# Patient Record
Sex: FEMALE | Race: BLACK OR AFRICAN AMERICAN | NOT HISPANIC OR LATINO | Employment: FULL TIME | URBAN - METROPOLITAN AREA
[De-identification: names, ages, dates, MRNs, and addresses within clinical notes are randomized per-mention and may not be internally consistent; named-entity substitution may affect disease eponyms.]

---

## 2017-07-20 ENCOUNTER — OFFICE VISIT (OUTPATIENT)
Dept: OBSTETRICS AND GYNECOLOGY | Facility: CLINIC | Age: 27
End: 2017-07-20
Payer: MEDICAID

## 2017-07-20 ENCOUNTER — LAB VISIT (OUTPATIENT)
Dept: LAB | Facility: HOSPITAL | Age: 27
End: 2017-07-20
Attending: NURSE PRACTITIONER
Payer: MEDICAID

## 2017-07-20 VITALS
HEIGHT: 64 IN | BODY MASS INDEX: 28.24 KG/M2 | DIASTOLIC BLOOD PRESSURE: 70 MMHG | SYSTOLIC BLOOD PRESSURE: 119 MMHG | WEIGHT: 165.38 LBS

## 2017-07-20 DIAGNOSIS — B96.89 BV (BACTERIAL VAGINOSIS): ICD-10-CM

## 2017-07-20 DIAGNOSIS — R10.2 PELVIC PAIN IN FEMALE: ICD-10-CM

## 2017-07-20 DIAGNOSIS — Z71.1 CONCERN ABOUT STD IN FEMALE WITHOUT DIAGNOSIS: ICD-10-CM

## 2017-07-20 DIAGNOSIS — Z01.419 CERVICAL SMEAR, AS PART OF ROUTINE GYNECOLOGICAL EXAMINATION: ICD-10-CM

## 2017-07-20 DIAGNOSIS — Z01.419 CERVICAL SMEAR, AS PART OF ROUTINE GYNECOLOGICAL EXAMINATION: Primary | ICD-10-CM

## 2017-07-20 DIAGNOSIS — N76.0 BV (BACTERIAL VAGINOSIS): ICD-10-CM

## 2017-07-20 PROCEDURE — 99385 PREV VISIT NEW AGE 18-39: CPT | Mod: S$PBB,,, | Performed by: NURSE PRACTITIONER

## 2017-07-20 PROCEDURE — 88175 CYTOPATH C/V AUTO FLUID REDO: CPT

## 2017-07-20 PROCEDURE — 80074 ACUTE HEPATITIS PANEL: CPT

## 2017-07-20 PROCEDURE — 99999 PR PBB SHADOW E&M-EST. PATIENT-LVL III: CPT | Mod: PBBFAC,,, | Performed by: NURSE PRACTITIONER

## 2017-07-20 PROCEDURE — 36415 COLL VENOUS BLD VENIPUNCTURE: CPT

## 2017-07-20 PROCEDURE — 86703 HIV-1/HIV-2 1 RESULT ANTBDY: CPT

## 2017-07-20 PROCEDURE — 86592 SYPHILIS TEST NON-TREP QUAL: CPT

## 2017-07-20 RX ORDER — METRONIDAZOLE 500 MG/1
500 TABLET ORAL EVERY 12 HOURS
Qty: 14 TABLET | Refills: 0 | Status: SHIPPED | OUTPATIENT
Start: 2017-07-20 | End: 2017-07-27

## 2017-07-20 NOTE — PATIENT INSTRUCTIONS

## 2017-07-20 NOTE — LETTER
July 20, 2017      Nena Schafer MD  [address] [facility] [address]           [facility]  [address] [facility] [address]  Phone: [phone]  Fax: [phone]          Patient: Jennifer Fuller   MR Number: [id number]   YOB: 1990   Date of Visit: 7/20/2017       Dear Dr. Nena Schafer:    Thank you for referring Jennifer Fuller to me for evaluation. Attached you will find relevant portions of my assessment and plan of care.    If you have questions, please do not hesitate to call me. I look forward to following Jennifer Fuller along with you.    Sincerely,    Leonie Gregorio, BANG    Enclosure  CC:  No Recipients    If you would like to receive this communication electronically, please contact [email or web address] or [phone] to request more information on "[employer]" Link access.    For providers and/or their staff who would like to refer a patient to [facility], please contact us through our one-stop-shop provider referral line, [facility] , at [phone].    If you feel you have received this communication in error or would no longer like to receive these types of communications, please e-mail [email or web address]

## 2017-07-20 NOTE — PROGRESS NOTES
"CC: Well woman exam    Jennifer Fuller is a 26 y.o. female  presents for well woman exam.  LMP: Patient's last menstrual period was 2017 (approximate).. Cycles are every 26-28 days and not heavy.Patient states that she has had lower pelvic pain " on and off " for 2 years , after she had an EAB. Patient wants to see a fertility MD, she has been having unprotected sex for 12 months, not pregnant. Last pap exam was normal, . Wants to proceed with STD work-up.     Past Medical History:   Diagnosis Date    Anemia     Chlamydia      Past Surgical History:   Procedure Laterality Date     SECTION, CLASSIC       Social History     Social History    Marital status: Single     Spouse name: N/A    Number of children: N/A    Years of education: N/A     Occupational History    Not on file.     Social History Main Topics    Smoking status: Former Smoker     Packs/day: 0.25     Years: 1.00     Types: Cigarettes     Quit date: 2014    Smokeless tobacco: Never Used    Alcohol use Yes    Drug use: No    Sexual activity: Yes     Partners: Male     Birth control/ protection: None     Other Topics Concern    Not on file     Social History Narrative    No narrative on file     Family History   Problem Relation Age of Onset    Cancer Neg Hx     Drug abuse Neg Hx      OB History      Para Term  AB Living    2 1 1   1 1    SAB TAB Ectopic Multiple Live Births    1       1          /70   Ht 5' 4" (1.626 m)   Wt 75 kg (165 lb 5.5 oz)   LMP 2017 (Approximate)   BMI 28.38 kg/m²       ROS:  GENERAL: Denies weight gain or weight loss. Feeling well overall.   SKIN: Denies rash or lesions.   HEAD: Denies head injury or headache.   NODES: Denies enlarged lymph nodes.   CHEST: Denies chest pain or shortness of breath.   CARDIOVASCULAR: Denies palpitations or left sided chest pain.   ABDOMEN: HPI  URINARY: No frequency, dysuria, hematuria, or burning on " urination.  REPRODUCTIVE: See HPI.   BREASTS: The patient performs breast self-examination and denies pain, lumps, or nipple discharge.   HEMATOLOGIC: No easy bruisability or excessive bleeding.   MUSCULOSKELETAL: Denies joint pain or swelling.   NEUROLOGIC: Denies syncope or weakness.   PSYCHIATRIC: Denies depression, anxiety or mood swings.    PHYSICAL EXAM:  APPEARANCE: Well nourished, well developed, in no acute distress.  AFFECT: WNL, alert and oriented x 3  SKIN: No acne or hirsutism  NECK: Neck symmetric without masses or thyromegaly  NODES: No inguinal, cervical, axillary, or femoral lymph node enlargement  CHEST: Good respiratory effect  ABDOMEN: Soft.  No tenderness or masses.  No hepatosplenomegaly.  No hernias.  BREASTS: Symmetrical, no skin changes or visible lesions.  No palpable masses, nipple discharge bilaterally.  PELVIC: Normal external genitalia without lesions.  Normal hair distribution.  Adequate perineal body, normal urethral meatus.  Vagina thick green discharge.  Cervix pink, without lesions, discharge or tenderness.  Bimanual exam shows uterus to be normal size, regular, mobile and nontender.  Adnexa without masses or tenderness.    EXTREMITIES: No edema.    1. Cervical smear, as part of routine gynecological examination  C. trachomatis/N. gonorrhoeae by AMP DNA Cervicovaginal    Hepatitis panel, acute    HIV-1 and HIV-2 antibodies    RPR    Liquid-based pap smear, screening   2. Concern about STD in female without diagnosis  C. trachomatis/N. gonorrhoeae by AMP DNA Cervicovaginal    Hepatitis panel, acute    HIV-1 and HIV-2 antibodies    RPR    Liquid-based pap smear, screening   3. Pelvic pain in female  US Pelvis Limited Non OB    PLAN:  PE is unremarkable related to pelvic pain  Pap exam  STD work-up  Pelvic ultrasound   Flagyl rx Wet prep : BV  Patient to call Dr. Cali's office ( see HPI)    Patient was counseled today on A.C.S. Pap guidelines and recommendations for yearly pelvic  exams, mammograms and monthly self breast exams; to see her PCP for other health maintenance.

## 2017-07-21 DIAGNOSIS — A74.9 CHLAMYDIA: ICD-10-CM

## 2017-07-21 DIAGNOSIS — A74.9 CHLAMYDIA: Primary | ICD-10-CM

## 2017-07-21 LAB
C TRACH DNA SPEC QL NAA+PROBE: DETECTED
HAV IGM SERPL QL IA: NEGATIVE
HBV CORE IGM SERPL QL IA: NEGATIVE
HBV SURFACE AG SERPL QL IA: NEGATIVE
HCV AB SERPL QL IA: NEGATIVE
HIV 1+2 AB+HIV1 P24 AG SERPL QL IA: NEGATIVE
N GONORRHOEA DNA SPEC QL NAA+PROBE: NOT DETECTED
RPR SER QL: NORMAL

## 2017-07-21 RX ORDER — AZITHROMYCIN 500 MG/1
TABLET, FILM COATED ORAL
Qty: 2 TABLET | Refills: 0 | Status: SHIPPED | OUTPATIENT
Start: 2017-07-21 | End: 2017-07-21 | Stop reason: SDUPTHER

## 2017-07-22 RX ORDER — AZITHROMYCIN 500 MG/1
TABLET, FILM COATED ORAL
Qty: 2 TABLET | Refills: 0 | Status: SHIPPED | OUTPATIENT
Start: 2017-07-22 | End: 2017-12-08

## 2017-07-24 ENCOUNTER — TELEPHONE (OUTPATIENT)
Dept: OBSTETRICS AND GYNECOLOGY | Facility: CLINIC | Age: 27
End: 2017-07-24

## 2017-07-24 NOTE — TELEPHONE ENCOUNTER
Spoke with the patient and she said that the Rx was sent to the wrong pharmacy but it shows in here that the Rx was sent to [facility] which is the correct pharmacy. I called the pharmacy and gave a verbal order for the Flagyl 500 mg 1 PO q 12 hours disp #14 with no refills.

## 2017-07-24 NOTE — TELEPHONE ENCOUNTER
----- Message from Diana Sorenson sent at 7/24/2017  9:53 AM CDT -----  Patient returning nurse call. Please adv/call [phone].//thanks. cw

## 2017-07-26 ENCOUNTER — PATIENT MESSAGE (OUTPATIENT)
Dept: OBSTETRICS AND GYNECOLOGY | Facility: CLINIC | Age: 27
End: 2017-07-26

## 2017-07-26 ENCOUNTER — TELEPHONE (OUTPATIENT)
Dept: RADIOLOGY | Facility: HOSPITAL | Age: 27
End: 2017-07-26

## 2017-12-08 ENCOUNTER — HOSPITAL ENCOUNTER (EMERGENCY)
Facility: HOSPITAL | Age: 27
Discharge: HOME | End: 2017-12-08
Attending: EMERGENCY MEDICINE
Payer: MEDICARE

## 2017-12-08 VITALS
HEART RATE: 80 BPM | SYSTOLIC BLOOD PRESSURE: 118 MMHG | HEIGHT: 64 IN | BODY MASS INDEX: 28.15 KG/M2 | WEIGHT: 164.88 LBS | TEMPERATURE: 98 F | DIASTOLIC BLOOD PRESSURE: 78 MMHG | RESPIRATION RATE: 20 BRPM | OXYGEN SATURATION: 100 %

## 2017-12-08 DIAGNOSIS — Z3A.01 LESS THAN 8 WEEKS GESTATION OF PREGNANCY: ICD-10-CM

## 2017-12-08 DIAGNOSIS — O26.891 PELVIC PAIN IN PREGNANCY, ANTEPARTUM, FIRST TRIMESTER: Primary | ICD-10-CM

## 2017-12-08 DIAGNOSIS — R10.2 PELVIC PAIN IN PREGNANCY, ANTEPARTUM, FIRST TRIMESTER: Primary | ICD-10-CM

## 2017-12-08 LAB
ALBUMIN SERPL BCP-MCNC: 3.9 G/DL
ALP SERPL-CCNC: 60 U/L
ALT SERPL W/O P-5'-P-CCNC: 11 U/L
ANION GAP SERPL CALC-SCNC: 9 MMOL/L
AST SERPL-CCNC: 22 U/L
B-HCG UR QL: POSITIVE
BACTERIA #/AREA URNS HPF: ABNORMAL /HPF
BACTERIA GENITAL QL WET PREP: ABNORMAL
BASOPHILS # BLD AUTO: 0.02 K/UL
BASOPHILS NFR BLD: 0.1 %
BILIRUB SERPL-MCNC: 0.7 MG/DL
BILIRUB UR QL STRIP: NEGATIVE
BUN SERPL-MCNC: 15 MG/DL
CALCIUM SERPL-MCNC: 9.4 MG/DL
CHLORIDE SERPL-SCNC: 108 MMOL/L
CLARITY UR: CLEAR
CLUE CELLS VAG QL WET PREP: ABNORMAL
CO2 SERPL-SCNC: 21 MMOL/L
COLOR UR: YELLOW
CREAT SERPL-MCNC: 0.8 MG/DL
DIFFERENTIAL METHOD: ABNORMAL
EOSINOPHIL # BLD AUTO: 0.1 K/UL
EOSINOPHIL NFR BLD: 0.7 %
ERYTHROCYTE [DISTWIDTH] IN BLOOD BY AUTOMATED COUNT: 13.9 %
EST. GFR  (AFRICAN AMERICAN): >60 ML/MIN/1.73 M^2
EST. GFR  (NON AFRICAN AMERICAN): >60 ML/MIN/1.73 M^2
FILAMENT FUNGI VAG WET PREP-#/AREA: ABNORMAL
GLUCOSE SERPL-MCNC: 87 MG/DL
GLUCOSE UR QL STRIP: NEGATIVE
HCG INTACT+B SERPL-ACNC: 1151 MIU/ML
HCT VFR BLD AUTO: 37.6 %
HGB BLD-MCNC: 12.8 G/DL
HGB UR QL STRIP: ABNORMAL
HYALINE CASTS #/AREA URNS LPF: 0 /LPF
KETONES UR QL STRIP: ABNORMAL
LEUKOCYTE ESTERASE UR QL STRIP: ABNORMAL
LYMPHOCYTES # BLD AUTO: 2.5 K/UL
LYMPHOCYTES NFR BLD: 16.5 %
MCH RBC QN AUTO: 31.8 PG
MCHC RBC AUTO-ENTMCNC: 34 G/DL
MCV RBC AUTO: 93 FL
MICROSCOPIC COMMENT: ABNORMAL
MONOCYTES # BLD AUTO: 0.9 K/UL
MONOCYTES NFR BLD: 5.6 %
NEUTROPHILS # BLD AUTO: 11.8 K/UL
NEUTROPHILS NFR BLD: 77.1 %
NITRITE UR QL STRIP: NEGATIVE
PH UR STRIP: 6 [PH] (ref 5–8)
PLATELET # BLD AUTO: 318 K/UL
PMV BLD AUTO: 10.6 FL
POTASSIUM SERPL-SCNC: 3.4 MMOL/L
PROT SERPL-MCNC: 7.8 G/DL
PROT UR QL STRIP: ABNORMAL
RBC # BLD AUTO: 4.03 M/UL
RBC #/AREA URNS HPF: 13 /HPF (ref 0–4)
SODIUM SERPL-SCNC: 138 MMOL/L
SP GR UR STRIP: >=1.03 (ref 1–1.03)
SPECIMEN SOURCE: ABNORMAL
T VAGINALIS GENITAL QL WET PREP: ABNORMAL
URN SPEC COLLECT METH UR: ABNORMAL
UROBILINOGEN UR STRIP-ACNC: NEGATIVE EU/DL
WBC # BLD AUTO: 15.33 K/UL
WBC #/AREA URNS HPF: 100 /HPF (ref 0–5)
WBC #/AREA VAG WET PREP: ABNORMAL
YEAST GENITAL QL WET PREP: ABNORMAL

## 2017-12-08 PROCEDURE — 87210 SMEAR WET MOUNT SALINE/INK: CPT

## 2017-12-08 PROCEDURE — 80053 COMPREHEN METABOLIC PANEL: CPT

## 2017-12-08 PROCEDURE — 99285 EMERGENCY DEPT VISIT HI MDM: CPT | Mod: 25

## 2017-12-08 PROCEDURE — 84702 CHORIONIC GONADOTROPIN TEST: CPT

## 2017-12-08 PROCEDURE — 96374 THER/PROPH/DIAG INJ IV PUSH: CPT

## 2017-12-08 PROCEDURE — 87591 N.GONORRHOEAE DNA AMP PROB: CPT

## 2017-12-08 PROCEDURE — 96376 TX/PRO/DX INJ SAME DRUG ADON: CPT

## 2017-12-08 PROCEDURE — 85025 COMPLETE CBC W/AUTO DIFF WBC: CPT

## 2017-12-08 PROCEDURE — 81025 URINE PREGNANCY TEST: CPT

## 2017-12-08 PROCEDURE — 81000 URINALYSIS NONAUTO W/SCOPE: CPT

## 2017-12-08 PROCEDURE — 63600175 PHARM REV CODE 636 W HCPCS: Performed by: EMERGENCY MEDICINE

## 2017-12-08 RX ORDER — MORPHINE SULFATE 2 MG/ML
4 INJECTION, SOLUTION INTRAMUSCULAR; INTRAVENOUS
Status: COMPLETED | OUTPATIENT
Start: 2017-12-08 | End: 2017-12-08

## 2017-12-08 RX ORDER — ONDANSETRON 4 MG/1
4 TABLET, FILM COATED ORAL EVERY 6 HOURS
Qty: 15 TABLET | Refills: 0 | Status: SHIPPED | OUTPATIENT
Start: 2017-12-08 | End: 2018-08-29

## 2017-12-08 RX ORDER — HYDROCODONE BITARTRATE AND ACETAMINOPHEN 7.5; 325 MG/1; MG/1
1 TABLET ORAL EVERY 6 HOURS PRN
Qty: 11 TABLET | Refills: 0 | Status: SHIPPED | OUTPATIENT
Start: 2017-12-08 | End: 2017-12-18

## 2017-12-08 RX ORDER — NAPROXEN 500 MG/1
500 TABLET ORAL
Status: DISCONTINUED | OUTPATIENT
Start: 2017-12-08 | End: 2017-12-08

## 2017-12-08 RX ADMIN — Medication 4 MG: at 06:12

## 2017-12-08 RX ADMIN — Medication 4 MG: at 04:12

## 2017-12-08 NOTE — ED NOTES
"Pt reports pain was down to 5/10 before she went to restroom.  Upon urination, pt reports burning sensation and low abd pain "got much worse".  MD informed.  Will monitor  "

## 2017-12-08 NOTE — ED PROVIDER NOTES
"SCRIBE #1 NOTE: I, Angeline Mayfield, am scribing for, and in the presence of, Skyler Rothman MD. I have scribed the entire note.      History      Chief Complaint   Patient presents with    Pelvic Pain     states she was diagnosed with UTI and took medication for 5 days; still has pain and hematuria       Review of patient's allergies indicates:  No Known Allergies     HPI   HPI    2017, 3:38 PM   History obtained from the patient      History of Present Illness: Jennifer Fuller is a 26 y.o. female patient with hx of anemia who presents to the Emergency Department for pelvic pain for the past month. Symptoms are constant and moderate in severity. She states pain is from a "bladder infection." She was seen at Urgent care and prescribed Macrobid which she report did not help. Pt reports STD tests were negative. No mitigating or exacerbating factors reported. Associated sx include hematuria. Patient denies fever, chills, N/V, back pain, vaginal discharge, urinary frequency, dizziness, fatigue, light-headedness, and all other sxs at this time. She is unsure if she is pregnant. No further complaints or concerns at this time.     Arrival mode: Personal vehicle    PCP: Primary Doctor No       Past Medical History:  Past Medical History:   Diagnosis Date    Anemia     Chlamydia        Past Surgical History:  Past Surgical History:   Procedure Laterality Date     SECTION, CLASSIC           Family History:  Family History   Problem Relation Age of Onset    Cancer Neg Hx     Drug abuse Neg Hx        Social History:  Social History     Social History Main Topics    Smoking status: Former Smoker     Packs/day: 0.25     Years: 1.00     Types: Cigarettes     Quit date: 2014    Smokeless tobacco: Never Used    Alcohol use Yes    Drug use: No    Sexual activity: Yes     Partners: Male     Birth control/ protection: None       ROS   Review of Systems   Constitutional: Negative for chills, " fatigue and fever.   HENT: Negative for sore throat.    Respiratory: Negative for shortness of breath.    Cardiovascular: Negative for chest pain.   Gastrointestinal: Negative for abdominal pain, constipation, diarrhea, nausea and vomiting.   Genitourinary: Positive for hematuria and pelvic pain. Negative for difficulty urinating, dysuria, flank pain, frequency, vaginal bleeding and vaginal discharge.   Musculoskeletal: Negative for back pain.   Skin: Negative for rash.   Neurological: Negative for dizziness, weakness, light-headedness and headaches.   Hematological: Does not bruise/bleed easily.   All other systems reviewed and are negative.      Physical Exam      Initial Vitals [12/08/17 1534]   BP Pulse Resp Temp SpO2   130/73 93 16 99.9 °F (37.7 °C) 98 %      MAP       92          Physical Exam  Nursing Notes and Vital Signs Reviewed.  Constitutional: Patient is in no acute distress. Awake and alert. Well-developed and well-nourished.  Head: Atraumatic. Normocephalic.  Eyes: PERRL. EOM intact. Conjunctivae are not pale. No scleral icterus.  ENT: Mucous membranes are moist. Oropharynx is clear and symmetric.    Neck: Supple. Full ROM. No lymphadenopathy.  Cardiovascular: Regular rate. Regular rhythm. No murmurs, rubs, or gallops. Distal pulses are 2+ and symmetric.  Pulmonary/Chest: No respiratory distress. Clear to auscultation bilaterally. No wheezing, rales, or rhonchi.  Abdominal: Soft and non-distended.  There is no tenderness.  No rebound, guarding, or rigidity.  Good bowel sounds.    : NO CVA ttp.  Pelvic: A female chaperone was present for this examination. Nl external inspection. No lesions or abnormalities were visible on the labia majora or minora. Cervical os is closed. There is no CMT. There is no blood in the vaginal vault. There is a mild amount of while vaginal discharge. No adnexal tenderness. No adnexal masses.  Musculoskeletal: Moves all extremities. No obvious deformities. No edema. No calf  "tenderness.  Skin: Warm and dry.  Neurological:  Alert, awake, and appropriate.  Normal speech.  No acute focal neurological deficits are appreciated.  Psychiatric: Normal affect. Good eye contact. Appropriate in content.    ED Course    Procedures  ED Vital Signs:  Vitals:    12/08/17 1534 12/08/17 1720 12/08/17 1842   BP: 130/73 120/68 118/78   Pulse: 93 88 80   Resp: 16 18 20   Temp: 99.9 °F (37.7 °C) 98.3 °F (36.8 °C) 98 °F (36.7 °C)   TempSrc: Oral Oral Oral   SpO2: 98% 99% 100%   Weight: 74.8 kg (164 lb 14.5 oz)     Height: 5' 4" (1.626 m)         Abnormal Lab Results:  Labs Reviewed   URINALYSIS - Abnormal; Notable for the following:        Result Value    Specific Gravity, UA >=1.030 (*)     Protein, UA 2+ (*)     Ketones, UA 3+ (*)     Occult Blood UA 2+ (*)     Leukocytes, UA 1+ (*)     All other components within normal limits   URINALYSIS MICROSCOPIC - Abnormal; Notable for the following:     RBC, UA 13 (*)     WBC,  (*)     All other components within normal limits   CBC W/ AUTO DIFFERENTIAL - Abnormal; Notable for the following:     WBC 15.33 (*)     MCH 31.8 (*)     Gran # 11.8 (*)     Gran% 77.1 (*)     Lymph% 16.5 (*)     All other components within normal limits   COMPREHENSIVE METABOLIC PANEL - Abnormal; Notable for the following:     Potassium 3.4 (*)     CO2 21 (*)     All other components within normal limits   VAGINAL SCREEN - Abnormal; Notable for the following:     WBC - Vaginal Screen Occasional (*)     Bacteria - Vaginal Screen Few (*)     All other components within normal limits   C. TRACHOMATIS/N. GONORRHOEAE BY AMP DNA   PREGNANCY TEST, URINE RAPID   HCG, QUANTITATIVE, PREGNANCY        All Lab Results:  Results for orders placed or performed during the hospital encounter of 12/08/17   Urinalysis   Result Value Ref Range    Specimen UA Urine, Clean Catch     Color, UA Yellow Yellow, Straw, Marlene    Appearance, UA Clear Clear    pH, UA 6.0 5.0 - 8.0    Specific Gravity, UA >=1.030 " (A) 1.005 - 1.030    Protein, UA 2+ (A) Negative    Glucose, UA Negative Negative    Ketones, UA 3+ (A) Negative    Bilirubin (UA) Negative Negative    Occult Blood UA 2+ (A) Negative    Nitrite, UA Negative Negative    Urobilinogen, UA Negative <2.0 EU/dL    Leukocytes, UA 1+ (A) Negative   Pregnancy, urine rapid   Result Value Ref Range    Preg Test, Ur Positive    Urinalysis Microscopic   Result Value Ref Range    RBC, UA 13 (H) 0 - 4 /hpf    WBC,  (H) 0 - 5 /hpf    Bacteria, UA Occasional None-Occ /hpf    Hyaline Casts, UA 0 0-1/lpf /lpf    Microscopic Comment SEE COMMENT    CBC auto differential   Result Value Ref Range    WBC 15.33 (H) 3.90 - 12.70 K/uL    RBC 4.03 4.00 - 5.40 M/uL    Hemoglobin 12.8 12.0 - 16.0 g/dL    Hematocrit 37.6 37.0 - 48.5 %    MCV 93 82 - 98 fL    MCH 31.8 (H) 27.0 - 31.0 pg    MCHC 34.0 32.0 - 36.0 g/dL    RDW 13.9 11.5 - 14.5 %    Platelets 318 150 - 350 K/uL    MPV 10.6 9.2 - 12.9 fL    Gran # 11.8 (H) 1.8 - 7.7 K/uL    Lymph # 2.5 1.0 - 4.8 K/uL    Mono # 0.9 0.3 - 1.0 K/uL    Eos # 0.1 0.0 - 0.5 K/uL    Baso # 0.02 0.00 - 0.20 K/uL    Gran% 77.1 (H) 38.0 - 73.0 %    Lymph% 16.5 (L) 18.0 - 48.0 %    Mono% 5.6 4.0 - 15.0 %    Eosinophil% 0.7 0.0 - 8.0 %    Basophil% 0.1 0.0 - 1.9 %    Differential Method Automated    Comprehensive metabolic panel   Result Value Ref Range    Sodium 138 136 - 145 mmol/L    Potassium 3.4 (L) 3.5 - 5.1 mmol/L    Chloride 108 95 - 110 mmol/L    CO2 21 (L) 23 - 29 mmol/L    Glucose 87 70 - 110 mg/dL    BUN, Bld 15 6 - 20 mg/dL    Creatinine 0.8 0.5 - 1.4 mg/dL    Calcium 9.4 8.7 - 10.5 mg/dL    Total Protein 7.8 6.0 - 8.4 g/dL    Albumin 3.9 3.5 - 5.2 g/dL    Total Bilirubin 0.7 0.1 - 1.0 mg/dL    Alkaline Phosphatase 60 55 - 135 U/L    AST 22 10 - 40 U/L    ALT 11 10 - 44 U/L    Anion Gap 9 8 - 16 mmol/L    eGFR if African American >60 >60 mL/min/1.73 m^2    eGFR if non African American >60 >60 mL/min/1.73 m^2   hCG, quantitative   Result Value  Ref Range    hCG Quant 1151 See Text mIU/mL   Vaginal Screen   Result Value Ref Range    Trichomonas None None    Clue Cells, Wet Prep None None    Budding Yeast None None    Fungal Hyphae None None    WBC - Vaginal Screen Occasional (A) None    Bacteria - Vaginal Screen Few (A) None    Wet Prep Source VAG None     Imaging Results:  Imaging Results          US OB Less Than 14 Wks with Transvaginal (xpd) (Final result)  Result time 12/08/17 18:14:25   Procedure changed from US OB Less Than 14 Wks First Gestation     Final result by EMILY Boateng Sr., MD (12/08/17 18:14:25)                 Impression:           1. There is no intrauterine pregnancy visualized. This is consistent with the patient's beta-hCG of 1151. I recommend a followup ultrasound examination in one week.  2. The maternal ovaries are normal in appearance.  3. There are several hypoechoic areas in the cervix. One of the larger ones measures 11 mm. It has a moderate amount of posterior acoustical enhancement. These may represent hemorrhagic and nonhemorrhagic nabothian cysts.      Electronically signed by: EMILY BOATENG MD  Date:     12/08/17  Time:    18:14              Narrative:    Pregnancy ultrasound examination    Clinical History:     Pregnant 26-year-old female; pelvic pain; beta hCG is 1151    Technique: Multiple static ultrasound images are submitted for interpretation.    Finding: The maternal uterus measures 9.6 cm in craniocaudal dimension by 4.4 cm in AP dimension by 5.2 cm in mediolateral dimension. There is a 2 mm hypoechoic area in the endometrial cavity of the maternal uterus. The endometrial stripe thickness measures 16 mm. There are several hypoechoic areas in the cervix. One of the larger ones measures 11 mm. It has a moderate amount of posterior acoustical enhancement.    The maternal ovaries are normal in appearance. The right ovary measures 2.3 cm x 1.6 cm x 1.8 cm. It has arterial flow with a peak systolic velocity of 20 cm/s.  The left ovary measures 3.1 cm x 1.9 cm x 2.4 cm. It has arterial flow with a peak systolic velocity of 30 cm/s.                               The Emergency Provider reviewed the vital signs and test results, which are outlined above.    ED Discussion   4:55 PM: No IUP visualized on beside US.    6:17 PM: Reassessed pt at this time. Pt is feeling better, her pain has improved. Discussed with pt lab and US results. Discussed pt dx and plan of tx. Informed pt to follow up with OB/GYN early next week for repeat US and beta HCG. All questions and concerns were addressed at this time. Pt expresses understanding of information and instructions, and is comfortable with plan to discharge. Pt is stable for discharge.    I discussed with patient that evaluation in the ED does not suggest any emergent or life threatening medical conditions requiring immediate intervention beyond what was provided in the ED, and I believe patient is safe for discharge.  Regardless, an unremarkable evaluation in the ED does not preclude the development or presence of a serious of life threatening condition. As such, patient was instructed to return immediately for any worsening or change in current symptoms.    ED Medication(s):  Medications   morphine injection 4 mg (4 mg Intravenous Given 12/8/17 1624)   morphine injection 4 mg (4 mg Intravenous Given 12/8/17 1842)       Discharge Medication List as of 12/8/2017  6:21 PM      START taking these medications    Details   hydrocodone-acetaminophen 7.5-325mg (NORCO) 7.5-325 mg per tablet Take 1 tablet by mouth every 6 (six) hours as needed for Pain., Starting Fri 12/8/2017, Until Mon 12/18/2017, Print      ondansetron (ZOFRAN) 4 MG tablet Take 1 tablet (4 mg total) by mouth every 6 (six) hours., Starting Fri 12/8/2017, Print             Follow-up Information     OB/GYN In 2 days.                  Medical Decision Making    Medical Decision Making:   Clinical Tests:   Lab Tests: Ordered and  Reviewed  Radiological Study: Reviewed and Ordered           Scribe Attestation:   Scribe #1: I performed the above scribed service and the documentation accurately describes the services I performed. I attest to the accuracy of the note.    Attending:   Physician Attestation Statement for Scribe #1: I, Skyler Rothman MD, personally performed the services described in this documentation, as scribed by Angeline Mayfield, in my presence, and it is both accurate and complete.          Clinical Impression       ICD-10-CM ICD-9-CM   1. Pelvic pain in pregnancy, antepartum, first trimester O26.891 646.83    R10.2 625.9   2. Less than 8 weeks gestation of pregnancy Z3A.01 V22.2       Disposition:   Disposition: Discharged  Condition: Stable         Skyler Rothman MD  12/08/17 0818

## 2017-12-09 LAB
C TRACH DNA SPEC QL NAA+PROBE: NOT DETECTED
N GONORRHOEA DNA SPEC QL NAA+PROBE: NOT DETECTED

## 2017-12-12 ENCOUNTER — TELEPHONE (OUTPATIENT)
Dept: OBSTETRICS AND GYNECOLOGY | Facility: CLINIC | Age: 27
End: 2017-12-12

## 2017-12-12 NOTE — TELEPHONE ENCOUNTER
----- Message from Lisa Saenz sent at 12/12/2017  7:59 AM CST -----  Contact: pt  Please call pt @ [phone], pt states she went to ER Friday for serve pelvis pain and was told to follow with clinic within a couple days, next available not until Jan. 2018.

## 2017-12-12 NOTE — TELEPHONE ENCOUNTER
----- Message from Shiloh Quarles sent at 12/12/2017  9:59 AM CST -----  Contact: pt  States she's returning a nurse call. Please call pt at [phone]. Thank you

## 2017-12-12 NOTE — TELEPHONE ENCOUNTER
Attempted to contact patient to get scheduled for pregnancy confirmation, no answer.  Left patient a voicemail message to call the clinic back.

## 2017-12-12 NOTE — TELEPHONE ENCOUNTER
Spoke with pt regarding ER follow. Pt indicated that she was told she is pregnant. She had 2 vaginal u/s, and a regular u/s, which then she was told they could not see anything, and to follow up with the clinic. Scheduled pt for a pregnancy confirmation on 12/18/17 @8am with Rusty Pozo. Pt verbalized understanding.

## 2018-06-18 ENCOUNTER — PATIENT MESSAGE (OUTPATIENT)
Dept: OBSTETRICS AND GYNECOLOGY | Facility: CLINIC | Age: 28
End: 2018-06-18

## 2018-07-22 ENCOUNTER — HOSPITAL ENCOUNTER (EMERGENCY)
Facility: HOSPITAL | Age: 28
Discharge: ANOTHER HOSPITAL | End: 2018-07-23
Attending: EMERGENCY MEDICINE
Payer: MEDICARE

## 2018-07-22 DIAGNOSIS — S09.93XA FACIAL INJURY, INITIAL ENCOUNTER: ICD-10-CM

## 2018-07-22 DIAGNOSIS — S79.921A INJURY OF RIGHT THIGH, INITIAL ENCOUNTER: ICD-10-CM
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
## 2018-07-22 DIAGNOSIS — S02.40DA MAXILLARY FRACTURE, LEFT SIDE, INITIAL ENCOUNTER FOR CLOSED FRACTURE: Primary | ICD-10-CM

## 2018-07-22 PROCEDURE — 99285 EMERGENCY DEPT VISIT HI MDM: CPT | Mod: 25

## 2018-07-22 RX ORDER — IBUPROFEN 600 MG/1
600 TABLET ORAL
Status: COMPLETED | OUTPATIENT
Start: 2018-07-23 | End: 2018-07-23

## 2018-07-23 VITALS
HEIGHT: 64 IN | BODY MASS INDEX: 26.44 KG/M2 | OXYGEN SATURATION: 95 % | TEMPERATURE: 99 F | HEART RATE: 67 BPM | WEIGHT: 154.88 LBS | RESPIRATION RATE: 18 BRPM | DIASTOLIC BLOOD PRESSURE: 70 MMHG | SYSTOLIC BLOOD PRESSURE: 97 MMHG

## 2018-07-23 PROCEDURE — 25000003 PHARM REV CODE 250: Performed by: NURSE PRACTITIONER

## 2018-07-23 RX ADMIN — IBUPROFEN 600 MG: 600 TABLET ORAL at 12:07
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
## 2018-08-29 ENCOUNTER — HOSPITAL ENCOUNTER (EMERGENCY)
Facility: HOSPITAL | Age: 28
Discharge: HOME | End: 2018-08-29
Attending: EMERGENCY MEDICINE
Payer: COMMERCIAL

## 2018-08-29 VITALS
HEIGHT: 64 IN | DIASTOLIC BLOOD PRESSURE: 68 MMHG | OXYGEN SATURATION: 99 % | WEIGHT: 155.44 LBS | HEART RATE: 76 BPM | TEMPERATURE: 98 F | BODY MASS INDEX: 26.54 KG/M2 | RESPIRATION RATE: 16 BRPM | SYSTOLIC BLOOD PRESSURE: 102 MMHG

## 2018-08-29 DIAGNOSIS — Z04.1 ENCOUNTER FOR EXAMINATION FOLLOWING MOTOR VEHICLE ACCIDENT (MVA): Primary | ICD-10-CM

## 2018-08-29 DIAGNOSIS — S13.4XXA WHIPLASH INJURY TO NECK, INITIAL ENCOUNTER: ICD-10-CM

## 2018-08-29 DIAGNOSIS — S33.5XXA SPRAIN OF LOW BACK, INITIAL ENCOUNTER: ICD-10-CM
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
## 2018-08-29 PROCEDURE — 99284 EMERGENCY DEPT VISIT MOD MDM: CPT

## 2018-08-29 PROCEDURE — 25000003 PHARM REV CODE 250: Performed by: NURSE PRACTITIONER

## 2018-08-29 RX ORDER — CYCLOBENZAPRINE HCL 10 MG
5 TABLET ORAL 3 TIMES DAILY PRN
Qty: 15 TABLET | Refills: 0 | Status: SHIPPED | OUTPATIENT
Start: 2018-08-29 | End: 2018-09-03

## 2018-08-29 RX ORDER — TRAMADOL HYDROCHLORIDE 50 MG/1
50 TABLET ORAL EVERY 6 HOURS PRN
Qty: 12 TABLET | Refills: 0 | Status: SHIPPED | OUTPATIENT
Start: 2018-08-29 | End: 2018-09-08

## 2018-08-29 RX ORDER — HYDROCODONE BITARTRATE AND ACETAMINOPHEN 10; 325 MG/1; MG/1
1 TABLET ORAL
Status: COMPLETED | OUTPATIENT
Start: 2018-08-29 | End: 2018-08-29

## 2018-08-29 RX ORDER — DICLOFENAC SODIUM 50 MG/1
50 TABLET, DELAYED RELEASE ORAL 3 TIMES DAILY PRN
Qty: 20 TABLET | Refills: 0 | Status: SHIPPED | OUTPATIENT
Start: 2018-08-29 | End: 2019-09-20

## 2018-08-29 RX ORDER — CYCLOBENZAPRINE HCL 10 MG
10 TABLET ORAL
Status: COMPLETED | OUTPATIENT
Start: 2018-08-29 | End: 2018-08-29

## 2018-08-29 RX ADMIN — HYDROCODONE BITARTRATE AND ACETAMINOPHEN 1 TABLET: 10; 325 TABLET ORAL at 06:08

## 2018-08-29 RX ADMIN — CYCLOBENZAPRINE HYDROCHLORIDE 10 MG: 10 TABLET, FILM COATED ORAL at 06:08
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
## 2019-03-18 ENCOUNTER — HOSPITAL ENCOUNTER (EMERGENCY)
Facility: HOSPITAL | Age: 29
Discharge: HOME | End: 2019-03-18
Attending: EMERGENCY MEDICINE
Payer: COMMERCIAL

## 2019-03-18 VITALS
DIASTOLIC BLOOD PRESSURE: 77 MMHG | TEMPERATURE: 98 F | BODY MASS INDEX: 28.88 KG/M2 | HEART RATE: 90 BPM | RESPIRATION RATE: 16 BRPM | HEIGHT: 64 IN | WEIGHT: 169.19 LBS | OXYGEN SATURATION: 98 % | SYSTOLIC BLOOD PRESSURE: 109 MMHG

## 2019-03-18 DIAGNOSIS — K08.89 PAIN, DENTAL: Primary | ICD-10-CM

## 2019-03-18 DIAGNOSIS — H92.02 EAR PAIN, LEFT: ICD-10-CM

## 2019-03-18 PROCEDURE — 99284 EMERGENCY DEPT VISIT MOD MDM: CPT

## 2019-03-18 RX ORDER — AMOXICILLIN AND CLAVULANATE POTASSIUM 875; 125 MG/1; MG/1
1 TABLET, FILM COATED ORAL 2 TIMES DAILY
Qty: 14 TABLET | Refills: 0 | Status: SHIPPED | OUTPATIENT
Start: 2019-03-18 | End: 2019-09-20

## 2019-03-18 RX ORDER — ACETAMINOPHEN AND CODEINE PHOSPHATE 300; 60 MG/1; MG/1
1 TABLET ORAL 4 TIMES DAILY PRN
Qty: 12 TABLET | Refills: 0 | Status: SHIPPED | OUTPATIENT
Start: 2019-03-18 | End: 2019-03-28

## 2019-03-18 RX ORDER — TRAMADOL HYDROCHLORIDE 50 MG/1
50 TABLET ORAL EVERY 6 HOURS PRN
Qty: 12 TABLET | Refills: 0 | Status: SHIPPED | OUTPATIENT
Start: 2019-03-18 | End: 2019-03-18 | Stop reason: SDUPTHER

## 2019-03-18 NOTE — ED PROVIDER NOTES
Encounter Date: 3/18/2019       History     Chief Complaint   Patient presents with    Otalgia     c/o L ear pain      28 year old female with complaint of left ear pain X 7 days.  Pt reports pain radiates into jaw. Moderate to severe pain.  No drainage. No fever or chills.  Constant aching pain.            Review of patient's allergies indicates:  No Known Allergies  Past Medical History:   Diagnosis Date    Anemia     Chlamydia      Past Surgical History:   Procedure Laterality Date     SECTION, CLASSIC       Family History   Problem Relation Age of Onset    Cancer Neg Hx     Drug abuse Neg Hx      Social History     Tobacco Use    Smoking status: Current Every Day Smoker     Packs/day: 0.50     Years: 1.00     Pack years: 0.50     Types: Cigarettes    Smokeless tobacco: Never Used   Substance Use Topics    Alcohol use: Yes    Drug use: No     Review of Systems   Constitutional: Negative for fever.   HENT: Positive for ear pain. Negative for sore throat.    Respiratory: Negative for shortness of breath.    Cardiovascular: Negative for chest pain.   Gastrointestinal: Negative for nausea.   Genitourinary: Negative for dysuria.   Musculoskeletal: Negative for back pain.   Skin: Negative for rash.   Neurological: Negative for weakness.   Hematological: Does not bruise/bleed easily.       Physical Exam     Initial Vitals [19 0813]   BP Pulse Resp Temp SpO2   109/77 90 16 98.2 °F (36.8 °C) 98 %      MAP       --         Physical Exam    Nursing note and vitals reviewed.  Constitutional: She appears well-developed and well-nourished.   HENT:   Head: Normocephalic and atraumatic.   Left bottom molar #3 tenderness, no tragal tenderness, left TM WNL, no TMJ tenderness   Eyes: Conjunctivae and EOM are normal. Pupils are equal, round, and reactive to light.   Neck: Normal range of motion. Neck supple.   Cardiovascular: Normal rate, regular rhythm, normal heart sounds and intact distal pulses.    Pulmonary/Chest: Breath sounds normal.   Abdominal: Soft. There is no tenderness. There is no rebound and no guarding.   Musculoskeletal: Normal range of motion.   Neurological: She is alert and oriented to person, place, and time. She has normal strength and normal reflexes.   Skin: Skin is warm and dry.   Psychiatric: She has a normal mood and affect. Her behavior is normal. Thought content normal.         ED Course   Procedures  Labs Reviewed - No data to display       Imaging Results    None                               Clinical Impression:       ICD-10-CM ICD-9-CM   1. Pain, dental K08.89 525.9   2. Ear pain, left H92.02 388.70                                Shun Linares NP  03/18/19 0850

## 2019-09-20 ENCOUNTER — HOSPITAL ENCOUNTER (EMERGENCY)
Facility: HOSPITAL | Age: 29
Discharge: HOME | End: 2019-09-20
Attending: EMERGENCY MEDICINE
Payer: COMMERCIAL

## 2019-09-20 VITALS
SYSTOLIC BLOOD PRESSURE: 112 MMHG | BODY MASS INDEX: 28.27 KG/M2 | RESPIRATION RATE: 16 BRPM | TEMPERATURE: 98 F | DIASTOLIC BLOOD PRESSURE: 62 MMHG | WEIGHT: 164.69 LBS | HEART RATE: 83 BPM | OXYGEN SATURATION: 99 %

## 2019-09-20 DIAGNOSIS — S39.012A STRAIN OF LUMBAR REGION, INITIAL ENCOUNTER: Primary | ICD-10-CM

## 2019-09-20 PROCEDURE — 99284 EMERGENCY DEPT VISIT MOD MDM: CPT | Mod: 25

## 2019-09-20 RX ORDER — CYCLOBENZAPRINE HCL 10 MG
10 TABLET ORAL 3 TIMES DAILY PRN
Qty: 15 TABLET | Refills: 0 | Status: SHIPPED | OUTPATIENT
Start: 2019-09-20 | End: 2019-09-25

## 2019-09-20 RX ORDER — NAPROXEN 375 MG/1
375 TABLET ORAL 2 TIMES DAILY WITH MEALS
Qty: 20 TABLET | Refills: 0 | Status: SHIPPED | OUTPATIENT
Start: 2019-09-20 | End: 2021-05-22
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
## 2021-05-22 ENCOUNTER — HOSPITAL ENCOUNTER (EMERGENCY)
Facility: HOSPITAL | Age: 31
Discharge: HOME | End: 2021-05-22
Attending: EMERGENCY MEDICINE
Payer: MEDICARE

## 2021-05-22 VITALS
DIASTOLIC BLOOD PRESSURE: 76 MMHG | TEMPERATURE: 98 F | WEIGHT: 208.44 LBS | HEART RATE: 90 BPM | HEIGHT: 64 IN | SYSTOLIC BLOOD PRESSURE: 135 MMHG | RESPIRATION RATE: 16 BRPM | OXYGEN SATURATION: 98 % | BODY MASS INDEX: 35.59 KG/M2

## 2021-05-22 DIAGNOSIS — K08.89 TOOTHACHE: Primary | ICD-10-CM

## 2021-05-22 PROCEDURE — 99283 EMERGENCY DEPT VISIT LOW MDM: CPT

## 2021-05-22 RX ORDER — TRAMADOL HYDROCHLORIDE 50 MG/1
50 TABLET ORAL EVERY 6 HOURS PRN
Qty: 6 TABLET | Refills: 0 | Status: SHIPPED | OUTPATIENT
Start: 2021-05-22 | End: 2021-06-01

## 2021-05-23 ENCOUNTER — HOSPITAL ENCOUNTER (EMERGENCY)
Facility: HOSPITAL | Age: 31
Discharge: HOME | End: 2021-05-23
Attending: EMERGENCY MEDICINE
Payer: MEDICAID

## 2021-05-23 VITALS
OXYGEN SATURATION: 99 % | SYSTOLIC BLOOD PRESSURE: 115 MMHG | DIASTOLIC BLOOD PRESSURE: 67 MMHG | BODY MASS INDEX: 35.78 KG/M2 | HEART RATE: 89 BPM | RESPIRATION RATE: 18 BRPM | TEMPERATURE: 98 F | HEIGHT: 64 IN

## 2021-05-23 DIAGNOSIS — K08.89 PAIN, DENTAL: ICD-10-CM

## 2021-05-23 DIAGNOSIS — J32.0 LEFT MAXILLARY SINUSITIS: Primary | ICD-10-CM

## 2021-05-23 LAB
ANION GAP SERPL CALC-SCNC: 6 MMOL/L (ref 8–16)
BASOPHILS # BLD AUTO: 0.04 K/UL (ref 0–0.2)
BASOPHILS NFR BLD: 0.5 % (ref 0–1.9)
BUN SERPL-MCNC: 12 MG/DL (ref 6–20)
CALCIUM SERPL-MCNC: 8.7 MG/DL (ref 8.7–10.5)
CHLORIDE SERPL-SCNC: 107 MMOL/L (ref 95–110)
CO2 SERPL-SCNC: 27 MMOL/L (ref 23–29)
CREAT SERPL-MCNC: 0.8 MG/DL (ref 0.5–1.4)
DIFFERENTIAL METHOD: ABNORMAL
EOSINOPHIL # BLD AUTO: 0.3 K/UL (ref 0–0.5)
EOSINOPHIL NFR BLD: 3 % (ref 0–8)
ERYTHROCYTE [DISTWIDTH] IN BLOOD BY AUTOMATED COUNT: 11.5 % (ref 11.5–14.5)
EST. GFR  (AFRICAN AMERICAN): >60 ML/MIN/1.73 M^2
EST. GFR  (NON AFRICAN AMERICAN): >60 ML/MIN/1.73 M^2
GLUCOSE SERPL-MCNC: 83 MG/DL (ref 70–110)
HCT VFR BLD AUTO: 40.1 % (ref 37–48.5)
HGB BLD-MCNC: 12.9 G/DL (ref 12–16)
IMM GRANULOCYTES # BLD AUTO: 0.04 K/UL (ref 0–0.04)
IMM GRANULOCYTES NFR BLD AUTO: 0.5 % (ref 0–0.5)
LYMPHOCYTES # BLD AUTO: 1.7 K/UL (ref 1–4.8)
LYMPHOCYTES NFR BLD: 19.8 % (ref 18–48)
MCH RBC QN AUTO: 32.1 PG (ref 27–31)
MCHC RBC AUTO-ENTMCNC: 32.2 G/DL (ref 32–36)
MCV RBC AUTO: 100 FL (ref 82–98)
MONOCYTES # BLD AUTO: 0.5 K/UL (ref 0.3–1)
MONOCYTES NFR BLD: 5.2 % (ref 4–15)
NEUTROPHILS # BLD AUTO: 6.2 K/UL (ref 1.8–7.7)
NEUTROPHILS NFR BLD: 71 % (ref 38–73)
NRBC BLD-RTO: 0 /100 WBC
PLATELET # BLD AUTO: 293 K/UL (ref 150–450)
PMV BLD AUTO: 10.1 FL (ref 9.2–12.9)
POTASSIUM SERPL-SCNC: 4.2 MMOL/L (ref 3.5–5.1)
RBC # BLD AUTO: 4.02 M/UL (ref 4–5.4)
SODIUM SERPL-SCNC: 140 MMOL/L (ref 136–145)
WBC # BLD AUTO: 8.67 K/UL (ref 3.9–12.7)

## 2021-05-23 PROCEDURE — 99284 EMERGENCY DEPT VISIT MOD MDM: CPT | Mod: 25

## 2021-05-23 PROCEDURE — 80048 BASIC METABOLIC PNL TOTAL CA: CPT | Performed by: NURSE PRACTITIONER

## 2021-05-23 PROCEDURE — 85025 COMPLETE CBC W/AUTO DIFF WBC: CPT | Performed by: NURSE PRACTITIONER

## 2021-05-23 PROCEDURE — 25000003 PHARM REV CODE 250: Performed by: NURSE PRACTITIONER

## 2021-05-23 PROCEDURE — 25500020 PHARM REV CODE 255: Performed by: EMERGENCY MEDICINE

## 2021-05-23 RX ORDER — AMOXICILLIN AND CLAVULANATE POTASSIUM 875; 125 MG/1; MG/1
1 TABLET, FILM COATED ORAL 2 TIMES DAILY
Qty: 28 TABLET | Refills: 0 | Status: SHIPPED | OUTPATIENT
Start: 2021-05-23 | End: 2021-06-06

## 2021-05-23 RX ORDER — IBUPROFEN 800 MG/1
800 TABLET ORAL EVERY 6 HOURS PRN
Qty: 20 TABLET | Refills: 0 | Status: SHIPPED | OUTPATIENT
Start: 2021-05-23

## 2021-05-23 RX ORDER — HYDROCODONE BITARTRATE AND ACETAMINOPHEN 10; 325 MG/1; MG/1
1 TABLET ORAL EVERY 4 HOURS PRN
Qty: 18 TABLET | Refills: 0 | Status: SHIPPED | OUTPATIENT
Start: 2021-05-23

## 2021-05-23 RX ORDER — KETOROLAC TROMETHAMINE 10 MG/1
10 TABLET, FILM COATED ORAL
Status: COMPLETED | OUTPATIENT
Start: 2021-05-23 | End: 2021-05-23

## 2021-05-23 RX ADMIN — KETOROLAC TROMETHAMINE 10 MG: 10 TABLET, FILM COATED ORAL at 08:05

## 2021-05-23 RX ADMIN — IOHEXOL 100 ML: 350 INJECTION, SOLUTION INTRAVENOUS at 09:05

## 2021-07-11 VITALS
BODY MASS INDEX: 36.13 KG/M2 | WEIGHT: 211.63 LBS | RESPIRATION RATE: 20 BRPM | SYSTOLIC BLOOD PRESSURE: 118 MMHG | DIASTOLIC BLOOD PRESSURE: 57 MMHG | HEIGHT: 64 IN | OXYGEN SATURATION: 99 % | TEMPERATURE: 98 F | HEART RATE: 100 BPM

## 2021-07-11 PROCEDURE — 99284 EMERGENCY DEPT VISIT MOD MDM: CPT | Mod: 25

## 2021-07-11 PROCEDURE — 96372 THER/PROPH/DIAG INJ SC/IM: CPT

## 2021-07-12 ENCOUNTER — HOSPITAL ENCOUNTER (EMERGENCY)
Facility: HOSPITAL | Age: 31
Discharge: HOME | End: 2021-07-12
Attending: EMERGENCY MEDICINE
Payer: COMMERCIAL

## 2021-07-12 DIAGNOSIS — J06.9 UPPER RESPIRATORY TRACT INFECTION, UNSPECIFIED TYPE: ICD-10-CM

## 2021-07-12 DIAGNOSIS — K04.7 DENTAL ABSCESS: Primary | ICD-10-CM

## 2021-07-12 DIAGNOSIS — N89.8 VAGINAL DISCHARGE: ICD-10-CM

## 2021-07-12 LAB
B-HCG UR QL: NEGATIVE
BILIRUB UR QL STRIP: NEGATIVE
CLARITY UR: CLEAR
COLOR UR: YELLOW
CTP QC/QA: YES
GLUCOSE UR QL STRIP: NEGATIVE
HGB UR QL STRIP: NEGATIVE
KETONES UR QL STRIP: NEGATIVE
LEUKOCYTE ESTERASE UR QL STRIP: NEGATIVE
NITRITE UR QL STRIP: NEGATIVE
PH UR STRIP: 6 [PH] (ref 5–8)
PROT UR QL STRIP: NEGATIVE
SARS-COV-2 RDRP RESP QL NAA+PROBE: NEGATIVE
SP GR UR STRIP: >=1.03 (ref 1–1.03)
URN SPEC COLLECT METH UR: ABNORMAL
UROBILINOGEN UR STRIP-ACNC: NEGATIVE EU/DL

## 2021-07-12 PROCEDURE — 81025 URINE PREGNANCY TEST: CPT | Performed by: NURSE PRACTITIONER

## 2021-07-12 PROCEDURE — 81003 URINALYSIS AUTO W/O SCOPE: CPT | Performed by: NURSE PRACTITIONER

## 2021-07-12 PROCEDURE — U0002 COVID-19 LAB TEST NON-CDC: HCPCS | Performed by: NURSE PRACTITIONER

## 2021-07-12 PROCEDURE — 63600175 PHARM REV CODE 636 W HCPCS: Performed by: NURSE PRACTITIONER

## 2021-07-12 PROCEDURE — 87591 N.GONORRHOEAE DNA AMP PROB: CPT | Performed by: NURSE PRACTITIONER

## 2021-07-12 PROCEDURE — 25000003 PHARM REV CODE 250: Performed by: NURSE PRACTITIONER

## 2021-07-12 PROCEDURE — 63700000 PHARM REV CODE 250 ALT 637 W/O HCPCS: Performed by: NURSE PRACTITIONER

## 2021-07-12 PROCEDURE — 87491 CHLMYD TRACH DNA AMP PROBE: CPT | Performed by: NURSE PRACTITIONER

## 2021-07-12 RX ORDER — METHYLPREDNISOLONE 4 MG/1
TABLET ORAL
Qty: 1 PACKAGE | Refills: 0 | Status: SHIPPED | OUTPATIENT
Start: 2021-07-12 | End: 2021-08-02

## 2021-07-12 RX ORDER — AZITHROMYCIN 250 MG/1
1000 TABLET, FILM COATED ORAL
Status: COMPLETED | OUTPATIENT
Start: 2021-07-12 | End: 2021-07-12

## 2021-07-12 RX ORDER — ONDANSETRON 4 MG/1
4 TABLET, ORALLY DISINTEGRATING ORAL
Status: COMPLETED | OUTPATIENT
Start: 2021-07-12 | End: 2021-07-12

## 2021-07-12 RX ORDER — CEFTRIAXONE 250 MG/1
500 INJECTION, POWDER, FOR SOLUTION INTRAMUSCULAR; INTRAVENOUS
Status: COMPLETED | OUTPATIENT
Start: 2021-07-12 | End: 2021-07-12

## 2021-07-12 RX ADMIN — AZITHROMYCIN 1000 MG: 250 TABLET, FILM COATED ORAL at 02:07

## 2021-07-12 RX ADMIN — ONDANSETRON 4 MG: 4 TABLET, ORALLY DISINTEGRATING ORAL at 02:07

## 2021-07-12 RX ADMIN — CEFTRIAXONE SODIUM 500 MG: 250 INJECTION, POWDER, FOR SOLUTION INTRAMUSCULAR; INTRAVENOUS at 02:07

## 2021-07-15 LAB
C TRACH DNA SPEC QL NAA+PROBE: NOT DETECTED
N GONORRHOEA DNA SPEC QL NAA+PROBE: NOT DETECTED

## 2022-07-04 ENCOUNTER — HOSPITAL ENCOUNTER (EMERGENCY)
Facility: HOSPITAL | Age: 32
Discharge: HOME | End: 2022-07-04
Attending: EMERGENCY MEDICINE
Payer: COMMERCIAL

## 2022-07-04 VITALS
TEMPERATURE: 99 F | OXYGEN SATURATION: 100 % | HEART RATE: 90 BPM | RESPIRATION RATE: 16 BRPM | DIASTOLIC BLOOD PRESSURE: 80 MMHG | SYSTOLIC BLOOD PRESSURE: 115 MMHG
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
## 2022-07-04 DIAGNOSIS — S09.90XA INJURY OF HEAD, INITIAL ENCOUNTER: ICD-10-CM

## 2022-07-04 DIAGNOSIS — S01.81XA LACERATION OF FOREHEAD, INITIAL ENCOUNTER: Primary | ICD-10-CM

## 2022-07-04 PROCEDURE — 25000003 PHARM REV CODE 250: Performed by: REGISTERED NURSE

## 2022-07-04 PROCEDURE — 99284 EMERGENCY DEPT VISIT MOD MDM: CPT | Mod: 25

## 2022-07-04 PROCEDURE — 12011 RPR F/E/E/N/L/M 2.5 CM/<: CPT

## 2022-07-04 RX ORDER — LIDOCAINE HYDROCHLORIDE 10 MG/ML
10 INJECTION, SOLUTION EPIDURAL; INFILTRATION; INTRACAUDAL; PERINEURAL
Status: DISCONTINUED | OUTPATIENT
Start: 2022-07-04 | End: 2022-07-04

## 2022-07-04 RX ORDER — HYDROCODONE BITARTRATE AND ACETAMINOPHEN 5; 325 MG/1; MG/1
1 TABLET ORAL
Status: COMPLETED | OUTPATIENT
Start: 2022-07-04 | End: 2022-07-04

## 2022-07-04 RX ADMIN — HYDROCODONE BITARTRATE AND ACETAMINOPHEN 1 TABLET: 5; 325 TABLET ORAL at 05:07

## 2022-07-04 RX ADMIN — Medication 1 ML: at 06:07
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
## 2023-07-03 NOTE — TELEPHONE ENCOUNTER
----- Message from Mali De La Cruz sent at 7/21/2017  4:35 PM CDT -----  Contact: Pt  Pt called and stated she needed to speak to the nurse. She stated that her medication was sent to the wrong pharmacy. She stated she needs her medication sent to the following pharmacy.      [facility] [address] [place] [address] & [address] [address]  Phone: [phone] Fax: [phone]    She can be reached at [phone].    Thanks,  TF       
Left pt message that e-rx was sent to different pharmacy as requested.  
Comment: Discussed hydroquinone to help with PIH, patient defers at this time.
Render Risk Assessment In Note?: no
Detail Level: Simple

## 2023-08-12 ENCOUNTER — HOSPITAL ENCOUNTER (EMERGENCY)
Facility: HOSPITAL | Age: 33
Discharge: HOME | End: 2023-08-12
Attending: EMERGENCY MEDICINE
Payer: COMMERCIAL

## 2023-08-12 VITALS
HEIGHT: 64 IN | WEIGHT: 199.75 LBS | HEART RATE: 82 BPM | OXYGEN SATURATION: 99 % | RESPIRATION RATE: 14 BRPM | SYSTOLIC BLOOD PRESSURE: 124 MMHG | TEMPERATURE: 99 F | DIASTOLIC BLOOD PRESSURE: 72 MMHG | BODY MASS INDEX: 34.1 KG/M2

## 2023-08-12 DIAGNOSIS — K02.9 DENTAL CARIES: Primary | ICD-10-CM

## 2023-08-12 PROCEDURE — 25000003 PHARM REV CODE 250: Performed by: EMERGENCY MEDICINE

## 2023-08-12 PROCEDURE — 99284 EMERGENCY DEPT VISIT MOD MDM: CPT

## 2023-08-12 RX ORDER — CLINDAMYCIN HYDROCHLORIDE 150 MG/1
300 CAPSULE ORAL 4 TIMES DAILY
Qty: 56 CAPSULE | Refills: 0 | Status: SHIPPED | OUTPATIENT
Start: 2023-08-12 | End: 2023-08-19

## 2023-08-12 RX ORDER — KETOROLAC TROMETHAMINE 10 MG/1
10 TABLET, FILM COATED ORAL
Status: COMPLETED | OUTPATIENT
Start: 2023-08-12 | End: 2023-08-12

## 2023-08-12 RX ORDER — KETOROLAC TROMETHAMINE 10 MG/1
10 TABLET, FILM COATED ORAL EVERY 8 HOURS PRN
Qty: 9 TABLET | Refills: 0 | Status: SHIPPED | OUTPATIENT
Start: 2023-08-12

## 2023-08-12 RX ORDER — ALBUTEROL SULFATE 90 UG/1
2 AEROSOL, METERED RESPIRATORY (INHALATION) EVERY 4 HOURS PRN
Qty: 3.5 G | Refills: 0 | Status: SHIPPED | OUTPATIENT
Start: 2023-08-12 | End: 2024-08-11

## 2023-08-12 RX ADMIN — KETOROLAC TROMETHAMINE 10 MG: 10 TABLET, FILM COATED ORAL at 03:08

## 2023-08-12 NOTE — ED PROVIDER NOTES
"SCRIBE #1 NOTE: I, Joan Duran, am scribing for, and in the presence of, Karsten Raza Jr., MD. I have scribed the entire note.       History     Chief Complaint   Patient presents with    Otalgia     Pt CO pain to L ear and dental pain on L side x2 days with facial swelling noted.     Review of patient's allergies indicates:  No Known Allergies      History of Present Illness     HPI    2023, 3:40 AM  History obtained from the patient      History of Present Illness: Jennifer Fuller is a 32 y.o. female patient with a PMHx of anemia who presents to the Emergency Department for evaluation of right otalgia which onset 2 days PTA. Symptoms are constant and moderate in severity. Pt denies recent cotton swab use or swimming. Pt is also c/o right dental pain d/t dental caries. She did f/u with a dentist but states "whatever he put broke". The dental pain is radiating to right ear. Patient denies any fever, chills, and all other sxs at this time.  No further complaints or concerns at this time.       Arrival mode: Personal vehicle     PCP: No, Primary Doctor        Past Medical History:  Past Medical History:   Diagnosis Date    Anemia     Chlamydia        Past Surgical History:  Past Surgical History:   Procedure Laterality Date     SECTION, CLASSIC           Family History:  Family History   Problem Relation Age of Onset    No Known Problems Mother     No Known Problems Father     Cancer Neg Hx     Drug abuse Neg Hx        Social History:  Social History     Tobacco Use    Smoking status: Every Day     Current packs/day: 0.50     Average packs/day: 0.5 packs/day for 1 year (0.5 ttl pk-yrs)     Types: Cigarettes    Smokeless tobacco: Never   Substance and Sexual Activity    Alcohol use: Yes    Drug use: No    Sexual activity: Yes     Partners: Male     Birth control/protection: None        Review of Systems     Review of Systems   Constitutional:  Negative for chills and fever.   HENT:  " "Positive for dental problem (caries on right side with pain) and ear pain (right).       Physical Exam     Initial Vitals [08/12/23 0303]   BP Pulse Resp Temp SpO2   124/72 82 14 98.5 °F (36.9 °C) 99 %      MAP       --          Physical Exam  Nursing Notes and Vital Signs Reviewed.  Constitutional: Patient is in no acute distress. Well-developed and well-nourished.  Head: Atraumatic. Normocephalic.  Eyes:  EOM intact.  No scleral icterus.  ENT: Mucous membranes are moist.  Nares clear .  TMs are both normal.  There is no mastoid tenderness or erythema.  Evaluation of the oropharynx shows the patient has a large cavity to the right upper rear molar.  This is tender to the touch.  There is no pus or drainage.  Palpation reproduces her symptoms.  Neck:  Full ROM. No JVD.  No lymphadenopathy.  Musculoskeletal: Moves all extremities. No obvious deformities.  5 x 5 strength in all extremities   Skin: Warm and dry.  Neurological:  Alert, awake, and appropriate.  Normal speech.  No acute focal neurological deficits are appreciated.  Two through 12 intact bilaterally.  Psychiatric: Normal affect. Good eye contact. Appropriate in content.      ED Course   Procedures  ED Vital Signs:  Vitals:    08/12/23 0302 08/12/23 0303   BP:  124/72   Pulse:  82   Resp:  14   Temp:  98.5 °F (36.9 °C)   TempSrc: Oral Oral   SpO2:  99%   Weight:  90.6 kg (199 lb 11.8 oz)   Height:  5' 4" (1.626 m)       Abnormal Lab Results:  Labs Reviewed - No data to display     All Lab Results:      Imaging Results:  Imaging Results    None                 The Emergency Provider reviewed the vital signs and test results, which are outlined above.     ED Discussion     3:47 AM: Reassessed pt at this time.  Discussed with pt all pertinent ED information and results. Discussed pt dx and plan of tx. Gave pt all f/u and return to the ED instructions. All questions and concerns were addressed at this time. Pt expresses understanding of information and " instructions, and is comfortable with plan to discharge. Pt is stable for discharge.    I discussed with patient and/or family/caretaker that evaluation in the ED does not suggest any emergent or life threatening medical conditions requiring immediate intervention beyond what was provided in the ED, and I believe patient is safe for discharge.  Regardless, an unremarkable evaluation in the ED does not preclude the development or presence of a serious of life threatening condition. As such, patient was instructed to return immediately for any worsening or change in current symptoms.        Medical Decision Making:   Initial Assessment:   Patient presents complaining of pain to her right upper jaw radiating to her right ear.  She is a history of dental caries and notes that her filling has fallen out.  She denies any fever chills.  She is established with Dentistry.  Differential Diagnosis:   Dental pain, dental cavity, caries, dental infection, dental abscess, otitis media, otitis externa  ED Management:  Patient was evaluated history physical examination.  Physical exam revealed dental caries as the source of her pain.  Will treat with Toradol and antibiotics referred to dentist for further re-evaluation and treatment.  Patient is aware that she needs to see a dentist to have this fixed.  She seems reliable verbalized agreement understanding with the plan of care she is safe for discharge in my opinion           ED Medication(s):  Medications   ketorolac tablet 10 mg (10 mg Oral Given 8/12/23 0355)       New Prescriptions    ALBUTEROL (PROVENTIL/VENTOLIN HFA) 90 MCG/ACTUATION INHALER    Inhale 2 puffs into the lungs every 4 (four) hours as needed. Take 2 puffs of the lungs every 4 hr as needed for wheezing or shortness of breath    CLINDAMYCIN (CLEOCIN) 150 MG CAPSULE    Take 2 capsules (300 mg total) by mouth 4 (four) times daily. for 7 days    KETOROLAC (TORADOL) 10 MG TABLET    Take 1 tablet (10 mg total) by  mouth every 8 (eight) hours as needed.        Follow-up Information       Your Dentist.                                 Scribe Attestation:   Scribe #1: I performed the above scribed service and the documentation accurately describes the services I performed. I attest to the accuracy of the note.     Attending:   Physician Attestation Statement for Scribe #1: I, Karsten Raza Jr., MD, personally performed the services described in this documentation, as scribed by Joan Duran, in my presence, and it is both accurate and complete.           Clinical Impression       ICD-10-CM ICD-9-CM   1. Dental caries  K02.9 521.00       Disposition:   Disposition: Discharged  Condition: Stable        Karsten Raza Jr., MD  08/12/23 0413

## 2023-08-12 NOTE — DISCHARGE INSTRUCTIONS
Ear pain is coming from a cavity in her mouth.  You must see a dentist in order to get this fixed.  He is clindamycin prevent infection Toradol for pain.  Do not take over-the-counter medications while taking Toradol as this may result in overdose.  Return as needed for any worsening symptoms, problems, questions or concerns.

## 2023-12-07 ENCOUNTER — HOSPITAL ENCOUNTER (EMERGENCY)
Facility: HOSPITAL | Age: 33
Discharge: HOME | End: 2023-12-07
Attending: EMERGENCY MEDICINE
Payer: COMMERCIAL

## 2023-12-07 VITALS
RESPIRATION RATE: 19 BRPM | TEMPERATURE: 98 F | DIASTOLIC BLOOD PRESSURE: 97 MMHG | HEART RATE: 98 BPM | OXYGEN SATURATION: 99 % | WEIGHT: 203.94 LBS | BODY MASS INDEX: 35 KG/M2 | SYSTOLIC BLOOD PRESSURE: 117 MMHG

## 2023-12-07 DIAGNOSIS — J06.9 VIRAL URI WITH COUGH: Primary | ICD-10-CM

## 2023-12-07 LAB
INFLUENZA A, MOLECULAR: NEGATIVE
INFLUENZA B, MOLECULAR: NEGATIVE
SARS-COV-2 RDRP RESP QL NAA+PROBE: NEGATIVE
SPECIMEN SOURCE: NORMAL

## 2023-12-07 PROCEDURE — U0002 COVID-19 LAB TEST NON-CDC: HCPCS | Performed by: NURSE PRACTITIONER

## 2023-12-07 PROCEDURE — 99284 EMERGENCY DEPT VISIT MOD MDM: CPT

## 2023-12-07 PROCEDURE — 96372 THER/PROPH/DIAG INJ SC/IM: CPT | Performed by: NURSE PRACTITIONER

## 2023-12-07 PROCEDURE — 63600175 PHARM REV CODE 636 W HCPCS: Performed by: NURSE PRACTITIONER

## 2023-12-07 PROCEDURE — 25000003 PHARM REV CODE 250: Performed by: NURSE PRACTITIONER

## 2023-12-07 PROCEDURE — 87502 INFLUENZA DNA AMP PROBE: CPT | Performed by: NURSE PRACTITIONER

## 2023-12-07 RX ORDER — DEXAMETHASONE SODIUM PHOSPHATE 4 MG/ML
8 INJECTION, SOLUTION INTRA-ARTICULAR; INTRALESIONAL; INTRAMUSCULAR; INTRAVENOUS; SOFT TISSUE
Status: COMPLETED | OUTPATIENT
Start: 2023-12-07 | End: 2023-12-07

## 2023-12-07 RX ORDER — PROMETHAZINE HYDROCHLORIDE AND DEXTROMETHORPHAN HYDROBROMIDE 6.25; 15 MG/5ML; MG/5ML
5 SYRUP ORAL EVERY 4 HOURS PRN
Qty: 180 ML | Refills: 0 | Status: SHIPPED | OUTPATIENT
Start: 2023-12-07 | End: 2023-12-17

## 2023-12-07 RX ORDER — IBUPROFEN 800 MG/1
800 TABLET ORAL
Status: COMPLETED | OUTPATIENT
Start: 2023-12-07 | End: 2023-12-07

## 2023-12-07 RX ORDER — IBUPROFEN 800 MG/1
800 TABLET ORAL EVERY 6 HOURS PRN
Qty: 20 TABLET | Refills: 0 | Status: SHIPPED | OUTPATIENT
Start: 2023-12-07

## 2023-12-07 RX ADMIN — IBUPROFEN 800 MG: 800 TABLET, FILM COATED ORAL at 10:12

## 2023-12-07 RX ADMIN — DEXAMETHASONE SODIUM PHOSPHATE 8 MG: 4 INJECTION INTRA-ARTICULAR; INTRALESIONAL; INTRAMUSCULAR; INTRAVENOUS; SOFT TISSUE at 10:12

## 2023-12-08 NOTE — ED PROVIDER NOTES
Encounter Date: 2023       History     Chief Complaint   Patient presents with    General Illness     Pt states for the past day she has been dealing with body aches, chills and cough. Pt denies N/V.      Patient is a 32-year-old female who presents with complaints of cough, chills, body aches.  Onset of symptoms was 3 days ago.  Other family members have similar symptoms at home.  Patient denies any chest pain, shortness of breath, nausea, vomiting.  Patient reports minimal relief with over-the-counter medications taken at home.  Patient shows no signs distress at this time.      Review of patient's allergies indicates:  No Known Allergies  Past Medical History:   Diagnosis Date    Anemia     Chlamydia      Past Surgical History:   Procedure Laterality Date     SECTION, CLASSIC       Family History   Problem Relation Age of Onset    No Known Problems Mother     No Known Problems Father     Cancer Neg Hx     Drug abuse Neg Hx      Social History     Tobacco Use    Smoking status: Every Day     Current packs/day: 0.50     Average packs/day: 0.5 packs/day for 1 year (0.5 ttl pk-yrs)     Types: Cigarettes    Smokeless tobacco: Never   Substance Use Topics    Alcohol use: Yes    Drug use: No     Review of Systems   Constitutional:  Positive for chills. Negative for fever.   HENT:  Negative for sore throat.    Respiratory:  Positive for cough. Negative for shortness of breath.    Cardiovascular:  Negative for chest pain.   Gastrointestinal:  Negative for nausea.   Genitourinary:  Negative for dysuria.   Musculoskeletal:  Positive for myalgias. Negative for back pain.   Skin:  Negative for rash.   Neurological:  Negative for weakness.   Hematological:  Does not bruise/bleed easily.       Physical Exam     Initial Vitals [23 2126]   BP Pulse Resp Temp SpO2   (!) 117/97 98 19 97.9 °F (36.6 °C) 99 %      MAP       --         Physical Exam    Nursing note and vitals reviewed.  Constitutional: She  appears well-developed and well-nourished.   HENT:   Head: Normocephalic and atraumatic.   Eyes: EOM are normal. Pupils are equal, round, and reactive to light.   Neck: Neck supple.   Normal range of motion.  Cardiovascular:  Normal rate, regular rhythm, normal heart sounds and intact distal pulses.           Pulmonary/Chest: Breath sounds normal.   Abdominal: Abdomen is soft. Bowel sounds are normal.   Musculoskeletal:         General: Normal range of motion.      Cervical back: Normal range of motion and neck supple.     Neurological: She is alert and oriented to person, place, and time. She has normal strength and normal reflexes.   Skin: Skin is warm and dry.         ED Course   Procedures  Labs Reviewed   INFLUENZA A & B BY MOLECULAR   SARS-COV-2 RNA AMPLIFICATION, QUAL          Imaging Results    None          Medications   dexAMETHasone injection 8 mg (has no administration in time range)   ibuprofen tablet 800 mg (has no administration in time range)     Medical Decision Making  I discussed with patient and/or family/caretaker that evaluation in the ED does not suggest any emergent or life threatening medical conditions requiring immediate intervention beyond what was provided in the ED, and I believe patient is safe for discharge. Regardless, an unremarkable evaluation in the ED does not preclude the development or presence of a serious of life threatening condition. As such, patient was instructed to return immediately for any worsening or change in current symptoms.      Risk  Prescription drug management.                                      Clinical Impression:  Final diagnoses:  [J06.9] Viral URI with cough (Primary)          ED Disposition Condition    Discharge Stable          ED Prescriptions       Medication Sig Dispense Start Date End Date Auth. Provider    ibuprofen (ADVIL,MOTRIN) 800 MG tablet Take 1 tablet (800 mg total) by mouth every 6 (six) hours as needed for Pain. 20 tablet 12/7/2023 --  Alfredo Reeves, NP    promethazine-dextromethorphan (PROMETHAZINE-DM) 6.25-15 mg/5 mL Syrp Take 5 mLs by mouth every 4 (four) hours as needed (cough). 180 mL 12/7/2023 12/17/2023 Alfredo Reeves, BANG          Follow-up Information    None          Alfredo Reeves, BANG  12/07/23 5894

## 2024-04-03 ENCOUNTER — PATIENT MESSAGE (OUTPATIENT)
Dept: PULMONOLOGY | Facility: CLINIC | Age: 34
End: 2024-04-03
Payer: COMMERCIAL